# Patient Record
Sex: MALE | Race: WHITE | NOT HISPANIC OR LATINO
[De-identification: names, ages, dates, MRNs, and addresses within clinical notes are randomized per-mention and may not be internally consistent; named-entity substitution may affect disease eponyms.]

---

## 2018-06-12 ENCOUNTER — OTHER (OUTPATIENT)
Age: 38
End: 2018-06-12

## 2018-06-12 DIAGNOSIS — M25.559 PAIN IN UNSPECIFIED HIP: ICD-10-CM

## 2018-06-18 ENCOUNTER — APPOINTMENT (OUTPATIENT)
Dept: ORTHOPEDIC SURGERY | Facility: CLINIC | Age: 38
End: 2018-06-18
Payer: COMMERCIAL

## 2018-06-18 VITALS
DIASTOLIC BLOOD PRESSURE: 76 MMHG | HEIGHT: 69 IN | WEIGHT: 165 LBS | BODY MASS INDEX: 24.44 KG/M2 | HEART RATE: 77 BPM | SYSTOLIC BLOOD PRESSURE: 112 MMHG

## 2018-06-18 DIAGNOSIS — Z78.9 OTHER SPECIFIED HEALTH STATUS: ICD-10-CM

## 2018-06-18 DIAGNOSIS — Z86.79 PERSONAL HISTORY OF OTHER DISEASES OF THE CIRCULATORY SYSTEM: ICD-10-CM

## 2018-06-18 PROCEDURE — 99204 OFFICE O/P NEW MOD 45 MIN: CPT

## 2018-06-18 PROCEDURE — 73502 X-RAY EXAM HIP UNI 2-3 VIEWS: CPT | Mod: RT

## 2018-07-31 ENCOUNTER — APPOINTMENT (OUTPATIENT)
Dept: ORTHOPEDIC SURGERY | Facility: CLINIC | Age: 38
End: 2018-07-31
Payer: COMMERCIAL

## 2018-07-31 VITALS
TEMPERATURE: 98.6 F | BODY MASS INDEX: 24.44 KG/M2 | HEIGHT: 69 IN | HEART RATE: 73 BPM | DIASTOLIC BLOOD PRESSURE: 64 MMHG | WEIGHT: 165 LBS | SYSTOLIC BLOOD PRESSURE: 101 MMHG

## 2018-07-31 PROCEDURE — 99213 OFFICE O/P EST LOW 20 MIN: CPT

## 2018-09-13 ENCOUNTER — RECORD ABSTRACTING (OUTPATIENT)
Age: 38
End: 2018-09-13

## 2018-09-17 ENCOUNTER — APPOINTMENT (OUTPATIENT)
Dept: CARDIOLOGY | Facility: CLINIC | Age: 38
End: 2018-09-17
Payer: COMMERCIAL

## 2018-09-17 VITALS
DIASTOLIC BLOOD PRESSURE: 80 MMHG | BODY MASS INDEX: 25.76 KG/M2 | WEIGHT: 170 LBS | SYSTOLIC BLOOD PRESSURE: 105 MMHG | HEART RATE: 75 BPM | RESPIRATION RATE: 16 BRPM | HEIGHT: 68 IN

## 2018-09-17 PROCEDURE — 93000 ELECTROCARDIOGRAM COMPLETE: CPT

## 2018-09-17 PROCEDURE — 99214 OFFICE O/P EST MOD 30 MIN: CPT

## 2018-10-03 ENCOUNTER — APPOINTMENT (OUTPATIENT)
Dept: ORTHOPEDIC SURGERY | Facility: CLINIC | Age: 38
End: 2018-10-03
Payer: COMMERCIAL

## 2018-10-03 VITALS — HEIGHT: 68 IN | WEIGHT: 170 LBS | BODY MASS INDEX: 25.76 KG/M2

## 2018-10-03 DIAGNOSIS — M25.851 OTHER SPECIFIED JOINT DISORDERS, RIGHT HIP: ICD-10-CM

## 2018-10-03 PROCEDURE — 99214 OFFICE O/P EST MOD 30 MIN: CPT

## 2018-10-13 ENCOUNTER — LABORATORY RESULT (OUTPATIENT)
Age: 38
End: 2018-10-13

## 2019-08-15 DIAGNOSIS — Z00.00 ENCOUNTER FOR GENERAL ADULT MEDICAL EXAMINATION W/OUT ABNORMAL FINDINGS: ICD-10-CM

## 2019-10-03 ENCOUNTER — NON-APPOINTMENT (OUTPATIENT)
Age: 39
End: 2019-10-03

## 2019-10-03 ENCOUNTER — APPOINTMENT (OUTPATIENT)
Dept: CARDIOLOGY | Facility: CLINIC | Age: 39
End: 2019-10-03
Payer: COMMERCIAL

## 2019-10-03 VITALS
SYSTOLIC BLOOD PRESSURE: 106 MMHG | DIASTOLIC BLOOD PRESSURE: 72 MMHG | RESPIRATION RATE: 16 BRPM | WEIGHT: 177 LBS | HEIGHT: 68 IN | BODY MASS INDEX: 26.83 KG/M2 | HEART RATE: 68 BPM

## 2019-10-03 PROCEDURE — 99214 OFFICE O/P EST MOD 30 MIN: CPT

## 2019-10-03 PROCEDURE — 93000 ELECTROCARDIOGRAM COMPLETE: CPT

## 2019-10-03 NOTE — HISTORY OF PRESENT ILLNESS
[FreeTextEntry1] : Patient is a 37yo M with a family h/o premature CAD, vasovagal syncope, former smoker here for cardiac follow up. Patient has been doing well without any chest pain or shortness of breath. Patient denies PND/orthopnea/edema/palpitations/syncope/claudication. Not following a cardiac healthy diet or exercising regularly. , works as . \par \par ROS: GI and  negative

## 2019-10-03 NOTE — ASSESSMENT
[FreeTextEntry1] : ECG: SR, no significant ST-T abnormalities and normal intervals \par \par ECHO 2017:\par 1. Left ventricular ejection fraction, by visual estimation, is 55%.\par 2. Normal left ventricular size and wall thickness, with normal systolic and diastolic function.\par 3. Normal RV/LA/RA\par 4. Mild PI \par 5. In comparison to the previous echocardiogram LVEF has increased.\par \par \par EST 2016:\par 1. Negative exercise stress test for ischemia.\par 2. The patient developed no dysrhythmias and occasional PACs during exercise.\par 3. The blood pressure response was normal.\par 4. The patient developed shortness of breath during the stress exam. The symptoms resolved with rest.\par 5. The test was terminated due to shortness of breath.\par 6. The patient's functional capacity was average.\par 7. The Duke Treadmill Score was 10, consistent with low risk.\par 8. Recommend clinical correlation with the above findings.

## 2019-10-03 NOTE — PHYSICAL EXAM
[General Appearance - Well Developed] : well developed [General Appearance - Well Nourished] : well nourished [Normal Conjunctiva] : the conjunctiva exhibited no abnormalities [Normal Oropharynx] : normal oropharynx [Normal Oral Mucosa] : normal oral mucosa [] : no respiratory distress [Normal Jugular Venous V Waves Present] : normal jugular venous V waves present [Respiration, Rhythm And Depth] : normal respiratory rhythm and effort [Auscultation Breath Sounds / Voice Sounds] : lungs were clear to auscultation bilaterally [Heart Rate And Rhythm] : heart rate and rhythm were normal [Heart Sounds] : normal S1 and S2 [Murmurs] : no murmurs present [Bowel Sounds] : normal bowel sounds [Abdomen Soft] : soft [Abdomen Tenderness] : non-tender [Abnormal Walk] : normal gait [Nail Clubbing] : no clubbing of the fingernails [Cyanosis, Localized] : no localized cyanosis [Skin Turgor] : normal skin turgor [Skin Color & Pigmentation] : normal skin color and pigmentation [Oriented To Time, Place, And Person] : oriented to person, place, and time [Affect] : the affect was normal [FreeTextEntry1] : no edema

## 2019-10-03 NOTE — DISCUSSION/SUMMARY
[FreeTextEntry1] : Patient is a 38yo M with a family h/o premature CAD, vasovagal syncope, former smoker here for cardiac follow up. Patient has been doing well without any chest pain or shortness of breath. Needs to improve diet and exercise regularly for primary prophylaxis, especially in setting of family history. BP controlled, exam benign and ECG unremarkable. No episodes recurrent vasovagal syncope either. \par \par 1. Echo and EST due to family history of premature CAD and prior to regular exercise\par 2. Recommend aggressive diet and lifestyle modifications \par 3. BW still pending\par 4. Call with results of testing and BW, if unremarkable follow up 1 year\par \par

## 2019-11-04 ENCOUNTER — APPOINTMENT (OUTPATIENT)
Dept: CARDIOLOGY | Facility: CLINIC | Age: 39
End: 2019-11-04

## 2019-12-17 ENCOUNTER — APPOINTMENT (OUTPATIENT)
Dept: CARDIOLOGY | Facility: CLINIC | Age: 39
End: 2019-12-17
Payer: COMMERCIAL

## 2019-12-17 PROCEDURE — 93306 TTE W/DOPPLER COMPLETE: CPT

## 2019-12-19 ENCOUNTER — APPOINTMENT (OUTPATIENT)
Dept: CARDIOLOGY | Facility: CLINIC | Age: 39
End: 2019-12-19
Payer: COMMERCIAL

## 2019-12-19 PROCEDURE — 93015 CV STRESS TEST SUPVJ I&R: CPT

## 2020-01-16 ENCOUNTER — APPOINTMENT (OUTPATIENT)
Dept: CARDIOLOGY | Facility: CLINIC | Age: 40
End: 2020-01-16
Payer: COMMERCIAL

## 2020-01-16 PROCEDURE — 93351 STRESS TTE COMPLETE: CPT

## 2020-04-09 PROBLEM — M25.559 HIP PAIN: Status: ACTIVE | Noted: 2018-06-12

## 2020-10-05 ENCOUNTER — NON-APPOINTMENT (OUTPATIENT)
Age: 40
End: 2020-10-05

## 2020-10-05 ENCOUNTER — APPOINTMENT (OUTPATIENT)
Dept: CARDIOLOGY | Facility: CLINIC | Age: 40
End: 2020-10-05
Payer: COMMERCIAL

## 2020-10-05 VITALS
OXYGEN SATURATION: 98 % | DIASTOLIC BLOOD PRESSURE: 63 MMHG | SYSTOLIC BLOOD PRESSURE: 105 MMHG | WEIGHT: 178 LBS | RESPIRATION RATE: 16 BRPM | HEART RATE: 74 BPM | TEMPERATURE: 97.6 F | BODY MASS INDEX: 26.98 KG/M2 | HEIGHT: 68 IN

## 2020-10-05 PROCEDURE — 99213 OFFICE O/P EST LOW 20 MIN: CPT

## 2020-10-05 PROCEDURE — 93000 ELECTROCARDIOGRAM COMPLETE: CPT

## 2020-10-05 NOTE — PHYSICAL EXAM
[General Appearance - Well Developed] : well developed [General Appearance - Well Nourished] : well nourished [Normal Conjunctiva] : the conjunctiva exhibited no abnormalities [Normal Oral Mucosa] : normal oral mucosa [Normal Oropharynx] : normal oropharynx [Normal Jugular Venous V Waves Present] : normal jugular venous V waves present [] : no respiratory distress [Respiration, Rhythm And Depth] : normal respiratory rhythm and effort [Auscultation Breath Sounds / Voice Sounds] : lungs were clear to auscultation bilaterally [Heart Rate And Rhythm] : heart rate and rhythm were normal [Heart Sounds] : normal S1 and S2 [Murmurs] : no murmurs present [Bowel Sounds] : normal bowel sounds [Abdomen Soft] : soft [Abdomen Tenderness] : non-tender [Abnormal Walk] : normal gait [Nail Clubbing] : no clubbing of the fingernails [Cyanosis, Localized] : no localized cyanosis [Skin Color & Pigmentation] : normal skin color and pigmentation [Skin Turgor] : normal skin turgor [Oriented To Time, Place, And Person] : oriented to person, place, and time [Affect] : the affect was normal [FreeTextEntry1] : no edema

## 2020-10-05 NOTE — DISCUSSION/SUMMARY
[FreeTextEntry1] : Patient is a 41yo M with a family h/o premature CAD, vasovagal syncope, former smoker here for cardiac follow up. Patient has been doing well without any chest pain or shortness of breath.  No episodes recurrent vasovagal syncope either. Negative stress echo earlier this year. Echo unremarkable in 2019 as well. ECG, BP and exam today unremarkable. BW unremarkable as well, normal CMP/CBC/TSH and lipids normal. \par \par 1. Recommend 30 minutes moderate intensity aerobic activity 5 days per week \par 2. Recommend aggressive diet and lifestyle modifications \par 3. Regular PMD follow up \par 4. Annual follow up here \par \par 
Yes

## 2020-10-05 NOTE — HISTORY OF PRESENT ILLNESS
[FreeTextEntry1] : Patient is a 41yo M with a family h/o premature CAD, vasovagal syncope, former smoker here for cardiac follow up. Patient has been doing well without any chest pain or shortness of breath. Patient denies PND/orthopnea/edema/palpitations/syncope/claudication. Not following a cardiac healthy diet or exercising regularly. \par \par , works as . Was off in march to june due to COVID. Has 3 yo son. \par \par ROS: GI and  negative

## 2020-10-05 NOTE — ASSESSMENT
[FreeTextEntry1] : ECG: SR, no significant ST-T abnormalities and normal intervals \par \par CAMRYN 1/2020:\par 1. Negative for ischemia\par 2. Rare PVC\par 3. Normal HR/BP response\par 4. Functional capacity above average \par \par ECHO 12/2019:\par 1. Normal LV size, systolic and diastolic function. EF 50-55%\par 2. Normal RV/LA/RA \par 3. Mild PI\par \par ECHO 2017:\par 1. Left ventricular ejection fraction, by visual estimation, is 55%.\par 2. Normal left ventricular size and wall thickness, with normal systolic and diastolic function.\par 3. Normal RV/LA/RA\par 4. Mild PI \par 5. In comparison to the previous echocardiogram LVEF has increased.\par \par \par EST 2016:\par 1. Negative exercise stress test for ischemia.\par 2. The patient developed no dysrhythmias and occasional PACs during exercise.\par 3. The blood pressure response was normal.\par 4. The patient developed shortness of breath during the stress exam. The symptoms resolved with rest.\par 5. The test was terminated due to shortness of breath.\par 6. The patient's functional capacity was average.\par 7. The Duke Treadmill Score was 10, consistent with low risk.\par 8. Recommend clinical correlation with the above findings.

## 2021-05-17 ENCOUNTER — OUTPATIENT (OUTPATIENT)
Dept: OUTPATIENT SERVICES | Facility: HOSPITAL | Age: 41
LOS: 1 days | End: 2021-05-17
Payer: COMMERCIAL

## 2021-05-17 DIAGNOSIS — Z20.828 CONTACT WITH AND (SUSPECTED) EXPOSURE TO OTHER VIRAL COMMUNICABLE DISEASES: ICD-10-CM

## 2021-05-17 LAB — SARS-COV-2 RNA SPEC QL NAA+PROBE: SIGNIFICANT CHANGE UP

## 2021-05-17 PROCEDURE — U0005: CPT

## 2021-05-17 PROCEDURE — C9803: CPT

## 2021-05-17 PROCEDURE — U0003: CPT

## 2021-05-18 DIAGNOSIS — Z20.828 CONTACT WITH AND (SUSPECTED) EXPOSURE TO OTHER VIRAL COMMUNICABLE DISEASES: ICD-10-CM

## 2021-10-07 ENCOUNTER — APPOINTMENT (OUTPATIENT)
Dept: CARDIOLOGY | Facility: CLINIC | Age: 41
End: 2021-10-07
Payer: COMMERCIAL

## 2021-10-07 ENCOUNTER — NON-APPOINTMENT (OUTPATIENT)
Age: 41
End: 2021-10-07

## 2021-10-07 VITALS
DIASTOLIC BLOOD PRESSURE: 67 MMHG | SYSTOLIC BLOOD PRESSURE: 102 MMHG | HEART RATE: 93 BPM | OXYGEN SATURATION: 97 % | RESPIRATION RATE: 16 BRPM | HEIGHT: 68 IN | BODY MASS INDEX: 26.67 KG/M2 | WEIGHT: 176 LBS

## 2021-10-07 DIAGNOSIS — R55 SYNCOPE AND COLLAPSE: ICD-10-CM

## 2021-10-07 PROCEDURE — 93000 ELECTROCARDIOGRAM COMPLETE: CPT

## 2021-10-07 PROCEDURE — 99214 OFFICE O/P EST MOD 30 MIN: CPT

## 2021-10-07 NOTE — HISTORY OF PRESENT ILLNESS
[FreeTextEntry1] : Patient is a 40yo M with a family h/o premature CAD, vasovagal syncope, former smoker here for cardiac follow up. Patient has been doing well without any chest pain or shortness of breath. Patient denies PND/orthopnea/edema/palpitations/syncope/claudication. No regular exercise \par \par , works as .  Recently had 2nd child, now 4 month old daughter. \par \par ROS: GI and  negative

## 2021-10-07 NOTE — DISCUSSION/SUMMARY
[FreeTextEntry1] : Patient is a 40yo M with a family h/o premature CAD, vasovagal syncope, former smoker for follow up. -Negative stress echo 2020 \par -Echo unremarkable in 2019 as well.\par -BP and ECG normal today, lipids well controlled from last year \par \par 1. Recommend 30 minutes moderate intensity aerobic activity 5 days per week \par 2. Recommend aggressive diet and lifestyle modifications \par 3. Check CMP, Mg, CBC, Lipids, TSH and call with results\par 4. Annual follow up\par \par \par

## 2021-10-07 NOTE — ASSESSMENT
[FreeTextEntry1] : ECG: SR, no significant ST-T abnormalities and normal intervals \par \par  HDL 60 LDL 77 TG 86 (83780) \par \par CAMRYN 1/2020:\par 1. Negative for ischemia\par 2. Rare PVC\par 3. Normal HR/BP response\par 4. Functional capacity above average \par \par ECHO 12/2019:\par 1. Normal LV size, systolic and diastolic function. EF 50-55%\par 2. Normal RV/LA/RA \par 3. Mild PI\par \par ECHO 2017:\par 1. Left ventricular ejection fraction, by visual estimation, is 55%.\par 2. Normal left ventricular size and wall thickness, with normal systolic and diastolic function.\par 3. Normal RV/LA/RA\par 4. Mild PI \par 5. In comparison to the previous echocardiogram LVEF has increased.\par \par \par

## 2022-10-27 ENCOUNTER — APPOINTMENT (OUTPATIENT)
Dept: ORTHOPEDIC SURGERY | Facility: CLINIC | Age: 42
End: 2022-10-27

## 2022-11-07 ENCOUNTER — APPOINTMENT (OUTPATIENT)
Dept: ORTHOPEDIC SURGERY | Facility: CLINIC | Age: 42
End: 2022-11-07

## 2022-11-07 VITALS — WEIGHT: 165 LBS | BODY MASS INDEX: 25.01 KG/M2 | HEIGHT: 68 IN

## 2022-11-07 DIAGNOSIS — M25.552 PAIN IN LEFT HIP: ICD-10-CM

## 2022-11-07 PROCEDURE — 99203 OFFICE O/P NEW LOW 30 MIN: CPT

## 2022-11-07 PROCEDURE — 73502 X-RAY EXAM HIP UNI 2-3 VIEWS: CPT

## 2022-11-07 NOTE — HISTORY OF PRESENT ILLNESS
[de-identified] : Pt is a 41 y/o male c/o left hip pain x 2 weeks on 10/23/22.  He states his lower back was tight and he went to stretch his legs. While stretching he felt a severe pain in his hip.  The pain is in the groin and he describes it as if his hip is out of place, and needs to "pop" back into place.  Since then he has had difficulty flexing the hip.  He started to wear his wife's old maternity belt around the hips and he states that it feels more supported.  Without the brace, his hip feels unstable.  He has also seen a chiropractor due to his left hip pain.

## 2022-11-07 NOTE — DISCUSSION/SUMMARY
[de-identified] : The underlying pathophysiology was reviewed with the patient. XR films were reviewed with the patient. Discussed at length the nature of the patient’s condition.\par \par At this time. I recommended use of oral and topical antiinflammatories as well as continued use of a support such as a binder. I also recommended physical therapy. I told him, if with time he does not improve, I would then recommend an MRI for further evaluation.\par The patient wishes to proceed with physical therapy of the left hip. A script was given.\par An MRI of the left hip was ordered to evaluate for labral tear. Patient will follow-up to review the results and discuss further treatment recommendations.\par \par All questions answered, understanding verbalized. Patient in agreement with plan of care.

## 2022-11-07 NOTE — END OF VISIT
[FreeTextEntry3] : All medical record entries made by the Scribe were at my,  Dr. Mt Bazzi MD., direction and personally dictated by me on 11/07/2022. I have personally reviewed the chart and agree that the record accurately reflects my personal performance of the history, physical exam, assessment and plan.

## 2022-11-07 NOTE — PHYSICAL EXAM
[de-identified] : Patient is WDWN, alert, and in no acute distress. Breathing is unlabored. He is grossly oriented to person, place, and time.\par \par Left hip:\par No deformities, ecchymosis or edema. \par There is left sided groin pain and discomfort on exam.\par Flex: Full without pain.\par Abd and Add are painless.\par Rotation elicits pain [de-identified] : AP and lateral views of the LEFT hip and pelvis were obtained and revealed no abnormalities. No acute fracture. No dislocation. Cartilage spaces are maintained.

## 2022-11-07 NOTE — ADDENDUM
[FreeTextEntry1] : I, Tarah Galarza wrote this note acting as a scribe for Dr. Mt Bazzi on Nov 07, 2022.

## 2023-01-09 ENCOUNTER — APPOINTMENT (OUTPATIENT)
Dept: ORTHOPEDIC SURGERY | Facility: CLINIC | Age: 43
End: 2023-01-09

## 2023-03-08 ENCOUNTER — APPOINTMENT (OUTPATIENT)
Dept: CARDIOLOGY | Facility: CLINIC | Age: 43
End: 2023-03-08
Payer: COMMERCIAL

## 2023-03-08 NOTE — PHYSICAL EXAM
[General Appearance - Well Developed] : well developed [General Appearance - Well Nourished] : well nourished [Normal Conjunctiva] : the conjunctiva exhibited no abnormalities [Normal Oral Mucosa] : normal oral mucosa [Normal Oropharynx] : normal oropharynx [Normal Jugular Venous V Waves Present] : normal jugular venous V waves present [] : no respiratory distress [Respiration, Rhythm And Depth] : normal respiratory rhythm and effort [Auscultation Breath Sounds / Voice Sounds] : lungs were clear to auscultation bilaterally [Heart Rate And Rhythm] : heart rate and rhythm were normal [Heart Sounds] : normal S1 and S2 [Murmurs] : no murmurs present [Bowel Sounds] : normal bowel sounds [Abdomen Soft] : soft [Abdomen Tenderness] : non-tender [Abnormal Walk] : normal gait [Nail Clubbing] : no clubbing of the fingernails [Cyanosis, Localized] : no localized cyanosis [FreeTextEntry1] : no edema [Skin Color & Pigmentation] : normal skin color and pigmentation [Skin Turgor] : normal skin turgor [Oriented To Time, Place, And Person] : oriented to person, place, and time [Affect] : the affect was normal

## 2023-03-08 NOTE — HISTORY OF PRESENT ILLNESS
[FreeTextEntry1] : Patient is a 41yo M with a family h/o premature CAD, vasovagal syncope, former smoker here for cardiac follow up. Patient has been doing well without any chest pain or shortness of breath. Patient denies PND/orthopnea/edema/palpitations/syncope/claudication. No regular exercise \par \par , works as .  HAs 2 young children \par \par ROS: GI and  negative

## 2023-03-08 NOTE — ASSESSMENT
[FreeTextEntry1] : ECG: SR, no significant ST-T abnormalities and normal intervals \par \par  HDL 60 LDL 77 TG 86 (15695) \par \par CAMRYN 1/2020:\par 1. Negative for ischemia\par 2. Rare PVC\par 3. Normal HR/BP response\par 4. Functional capacity above average \par \par ECHO 12/2019:\par 1. Normal LV size, systolic and diastolic function. EF 50-55%\par 2. Normal RV/LA/RA \par 3. Mild PI\par \par ECHO 2017:\par 1. Left ventricular ejection fraction, by visual estimation, is 55%.\par 2. Normal left ventricular size and wall thickness, with normal systolic and diastolic function.\par 3. Normal RV/LA/RA\par 4. Mild PI \par 5. In comparison to the previous echocardiogram LVEF has increased.\par \par \par

## 2023-03-08 NOTE — DISCUSSION/SUMMARY
[FreeTextEntry1] : Patient is a 43yo M with a family h/o premature CAD, vasovagal syncope, former smoker for follow up. \par -Negative stress echo 2020 \par -Echo unremarkable in 2019 as well.\par -BP and ECG normal today, lipids well controlled from last year \par \par 1. \par 2.\par \par \par

## 2023-03-27 ENCOUNTER — APPOINTMENT (OUTPATIENT)
Dept: CARDIOLOGY | Facility: CLINIC | Age: 43
End: 2023-03-27
Payer: COMMERCIAL

## 2023-03-27 ENCOUNTER — NON-APPOINTMENT (OUTPATIENT)
Age: 43
End: 2023-03-27

## 2023-03-27 VITALS
SYSTOLIC BLOOD PRESSURE: 106 MMHG | HEIGHT: 68 IN | WEIGHT: 166 LBS | OXYGEN SATURATION: 98 % | RESPIRATION RATE: 16 BRPM | HEART RATE: 71 BPM | BODY MASS INDEX: 25.16 KG/M2 | DIASTOLIC BLOOD PRESSURE: 70 MMHG

## 2023-03-27 DIAGNOSIS — Z87.891 PERSONAL HISTORY OF NICOTINE DEPENDENCE: ICD-10-CM

## 2023-03-27 DIAGNOSIS — R55 SYNCOPE AND COLLAPSE: ICD-10-CM

## 2023-03-27 DIAGNOSIS — R94.31 ABNORMAL ELECTROCARDIOGRAM [ECG] [EKG]: ICD-10-CM

## 2023-03-27 PROCEDURE — 99214 OFFICE O/P EST MOD 30 MIN: CPT

## 2023-03-27 PROCEDURE — 93000 ELECTROCARDIOGRAM COMPLETE: CPT

## 2023-03-27 NOTE — ASSESSMENT
[FreeTextEntry1] : ECG: SR, no significant ST-T abnormalities and normal intervals \par \par  HDL 60 LDL 77 TG 86 (13175) \par \par CAMRYN 1/2020:\par 1. Negative for ischemia\par 2. Rare PVC\par 3. Normal HR/BP response\par 4. Functional capacity above average \par \par ECHO 12/2019:\par 1. Normal LV size, systolic and diastolic function. EF 50-55%\par 2. Normal RV/LA/RA \par 3. Mild PI\par \par ECHO 2017:\par 1. Left ventricular ejection fraction, by visual estimation, is 55%.\par 2. Normal left ventricular size and wall thickness, with normal systolic and diastolic function.\par 3. Normal RV/LA/RA\par 4. Mild PI \par 5. In comparison to the previous echocardiogram LVEF has increased.\par \par \par

## 2023-03-27 NOTE — DISCUSSION/SUMMARY
[EKG obtained to assist in diagnosis and management of assessed problem(s)] : EKG obtained to assist in diagnosis and management of assessed problem(s) [FreeTextEntry1] : Patient is a 41yo M with a family h/o premature CAD, vasovagal syncope, former smoker for follow up. \par -Negative stress echo 2020 \par -Echo unremarkable in 2019 as well.\par -BP low normal and ECG normal today\par -Recent symptoms of ? significance\par \par 1. Echo and CAMRYN to evaluate above symptoms in patient with family history premature CAD\par 2. Increase fluid intake\par 3. BW including CMP, Mg, TSH, CBC\par 4. CAll with results\par 5. If negative testing, annual follow up\par \par

## 2023-03-27 NOTE — HISTORY OF PRESENT ILLNESS
[FreeTextEntry1] : Patient is a 43yo M with a family h/o premature CAD, vasovagal syncope, former smoker here for cardiac follow up. Feeling very fatigued and nearly passing out in mornings. Happened couple times. Feels better after drinking fluid. GEts up early for work (around 4am) and symptoms start around 7. Usually has breakfast around 4-430am. No exertional CP/SOB. Patient denies PND/orthopnea/edema/palpitations/syncope/claudication  \par \par  with 2  young kids, works as , current job in [place] now and commuting. \par \par ROS: GI and  negative

## 2023-05-11 ENCOUNTER — APPOINTMENT (OUTPATIENT)
Dept: CARDIOLOGY | Facility: CLINIC | Age: 43
End: 2023-05-11
Payer: COMMERCIAL

## 2023-05-11 PROCEDURE — 93306 TTE W/DOPPLER COMPLETE: CPT

## 2023-07-06 ENCOUNTER — APPOINTMENT (OUTPATIENT)
Dept: CARDIOLOGY | Facility: CLINIC | Age: 43
End: 2023-07-06
Payer: COMMERCIAL

## 2023-07-06 PROCEDURE — 93351 STRESS TTE COMPLETE: CPT

## 2023-07-06 RX ORDER — PERFLUTREN 6.52 MG/ML
6.52 INJECTION, SUSPENSION INTRAVENOUS
Qty: 2 | Refills: 0 | Status: COMPLETED | OUTPATIENT
Start: 2023-07-06

## 2023-07-06 RX ADMIN — PERFLUTREN MG/ML: 6.52 INJECTION, SUSPENSION INTRAVENOUS at 00:00

## 2024-07-02 ENCOUNTER — APPOINTMENT (OUTPATIENT)
Dept: CARDIOLOGY | Facility: CLINIC | Age: 44
End: 2024-07-02
Payer: COMMERCIAL

## 2024-07-02 VITALS
RESPIRATION RATE: 16 BRPM | BODY MASS INDEX: 28.04 KG/M2 | HEART RATE: 75 BPM | WEIGHT: 185 LBS | SYSTOLIC BLOOD PRESSURE: 110 MMHG | HEIGHT: 68 IN | OXYGEN SATURATION: 98 % | DIASTOLIC BLOOD PRESSURE: 80 MMHG

## 2024-07-02 DIAGNOSIS — Z82.49 FAMILY HISTORY OF ISCHEMIC HEART DISEASE AND OTHER DISEASES OF THE CIRCULATORY SYSTEM: ICD-10-CM

## 2024-07-02 DIAGNOSIS — R53.83 OTHER FATIGUE: ICD-10-CM

## 2024-07-02 PROCEDURE — 99214 OFFICE O/P EST MOD 30 MIN: CPT

## 2024-07-02 PROCEDURE — G2211 COMPLEX E/M VISIT ADD ON: CPT

## 2024-08-21 ENCOUNTER — APPOINTMENT (OUTPATIENT)
Dept: CT IMAGING | Facility: CLINIC | Age: 44
End: 2024-08-21
Payer: SELF-PAY

## 2024-08-21 ENCOUNTER — NON-APPOINTMENT (OUTPATIENT)
Age: 44
End: 2024-08-21

## 2024-08-21 ENCOUNTER — OUTPATIENT (OUTPATIENT)
Dept: OUTPATIENT SERVICES | Facility: HOSPITAL | Age: 44
LOS: 1 days | End: 2024-08-21
Payer: SELF-PAY

## 2024-08-21 DIAGNOSIS — Z82.49 FAMILY HISTORY OF ISCHEMIC HEART DISEASE AND OTHER DISEASES OF THE CIRCULATORY SYSTEM: ICD-10-CM

## 2024-08-21 PROCEDURE — 75571 CT HRT W/O DYE W/CA TEST: CPT | Mod: 26

## 2024-08-21 PROCEDURE — 75571 CT HRT W/O DYE W/CA TEST: CPT

## 2025-09-04 ENCOUNTER — APPOINTMENT (OUTPATIENT)
Dept: CARDIOLOGY | Facility: CLINIC | Age: 45
End: 2025-09-04
Payer: COMMERCIAL

## 2025-09-04 VITALS
DIASTOLIC BLOOD PRESSURE: 64 MMHG | SYSTOLIC BLOOD PRESSURE: 112 MMHG | WEIGHT: 192 LBS | BODY MASS INDEX: 29.1 KG/M2 | HEART RATE: 91 BPM | HEIGHT: 68 IN

## 2025-09-04 DIAGNOSIS — Z82.49 FAMILY HISTORY OF ISCHEMIC HEART DISEASE AND OTHER DISEASES OF THE CIRCULATORY SYSTEM: ICD-10-CM

## 2025-09-04 DIAGNOSIS — Z87.891 PERSONAL HISTORY OF NICOTINE DEPENDENCE: ICD-10-CM

## 2025-09-04 DIAGNOSIS — R55 SYNCOPE AND COLLAPSE: ICD-10-CM

## 2025-09-04 PROCEDURE — 99213 OFFICE O/P EST LOW 20 MIN: CPT

## 2025-09-04 PROCEDURE — 93000 ELECTROCARDIOGRAM COMPLETE: CPT
